# Patient Record
Sex: MALE | Race: BLACK OR AFRICAN AMERICAN | Employment: FULL TIME | URBAN - METROPOLITAN AREA
[De-identification: names, ages, dates, MRNs, and addresses within clinical notes are randomized per-mention and may not be internally consistent; named-entity substitution may affect disease eponyms.]

---

## 2023-06-21 ENCOUNTER — OFFICE VISIT (OUTPATIENT)
Dept: NEUROLOGY | Age: 43
End: 2023-06-21

## 2023-06-21 VITALS
SYSTOLIC BLOOD PRESSURE: 122 MMHG | BODY MASS INDEX: 26.9 KG/M2 | DIASTOLIC BLOOD PRESSURE: 86 MMHG | WEIGHT: 203 LBS | OXYGEN SATURATION: 98 % | HEART RATE: 60 BPM | HEIGHT: 73 IN

## 2023-06-21 DIAGNOSIS — M79.2 NEURALGIA AND NEURITIS, UNSPECIFIED: ICD-10-CM

## 2023-06-21 DIAGNOSIS — K14.6 BURNING MOUTH SYNDROME: Primary | ICD-10-CM

## 2023-06-21 LAB
ERYTHROCYTE [SEDIMENTATION RATE] IN BLOOD: 3 MM/HR (ref 0–20)
FOLATE SERPL-MCNC: 38.2 NG/ML (ref 3.1–17.5)

## 2023-06-21 RX ORDER — TERBINAFINE HYDROCHLORIDE 250 MG/1
250 TABLET ORAL DAILY
COMMUNITY
Start: 2023-06-06

## 2023-06-21 RX ORDER — GABAPENTIN 100 MG/1
100 CAPSULE ORAL 3 TIMES DAILY
COMMUNITY
Start: 2023-06-06

## 2023-06-21 NOTE — PROGRESS NOTES
Rocael Dobbins  [address], [address] [facility], [address] [place] [address]  Phone: [phone] Fax [phone]  Anoop Gonzales MD      Patient: Cy Muñoz  Provider: Anoop Gonzales MD    CC:   Chief Complaint   Patient presents with    New Patient     Per patient, he has burning mouth syndrome. Wants to discuss possible issues with nervous system. Referring Provider:    History of Present Illness:     Cy Muñoz is a 37 y.o. RH male who presents for evaluation of burning mouth syndrome. He is unaccompanied for today's visit. Briefly, patient presents for further evaluation of burning mouth syndrome. He has a long history of symptoms clinically suggestive burning mouth syndrome. He notes a continuous and uncomfortable sensation on the roof of his mouth, feeling like a \"thin film\". Nothing in particular provides relief except eating/chewing gum. He rubs his tongue on the roof of his mouth constantly due to the sensations. Sensations have been present since approximately August 2021, often worse at night. He has had several dental evaluations with unremarkable findings. Recent MRI of the brain was without any evidence of intracranial pathology. Of note there was mild thickening of the paranasal mucosa. Additionally soft tissue imaging of the neck was unrevealing. Most recently tried gabapentin up to 100 mg 3 times a day without relief. Previous medication trials include amitriptyline, over-the-counter Zyrtec, and other topical ointments. He has been looking into other alternative treatments. He denies any vision changes, speech changes, swallowing difficulty, numbness or weakness in the upper or lower extremities, gait or balance disturbance, cognitive or psychiatric changes. Review of Systems:   Review of Systems   Constitutional:  Negative for fever. HENT:  Negative for congestion. Eyes:  Negative for visual disturbance. Respiratory:  Negative for cough.

## 2023-06-22 LAB
ANA SER QL: NEGATIVE
ENA SS-A AB SER-ACNC: <0.2 AI (ref 0–0.9)
ENA SS-B AB SER-ACNC: <0.2 AI (ref 0–0.9)

## 2023-06-23 LAB — ZINC SERPL-MCNC: 81 UG/DL (ref 44–115)

## 2023-06-24 ASSESSMENT — ENCOUNTER SYMPTOMS: COUGH: 0

## 2023-06-27 LAB — VIT B1 BLD-SCNC: 139.2 NMOL/L (ref 66.5–200)
